# Patient Record
Sex: MALE | Race: WHITE | Employment: UNEMPLOYED | URBAN - METROPOLITAN AREA
[De-identification: names, ages, dates, MRNs, and addresses within clinical notes are randomized per-mention and may not be internally consistent; named-entity substitution may affect disease eponyms.]

---

## 2022-07-07 ENCOUNTER — HOSPITAL ENCOUNTER (EMERGENCY)
Age: 27
Discharge: HOME | End: 2022-07-07
Attending: EMERGENCY MEDICINE
Payer: MEDICARE

## 2022-07-07 VITALS
TEMPERATURE: 98.1 F | HEART RATE: 70 BPM | OXYGEN SATURATION: 96 % | DIASTOLIC BLOOD PRESSURE: 79 MMHG | SYSTOLIC BLOOD PRESSURE: 108 MMHG | RESPIRATION RATE: 18 BRPM

## 2022-07-07 DIAGNOSIS — L73.9 FOLLICULITIS: Primary | ICD-10-CM

## 2022-07-07 PROCEDURE — 99283 EMERGENCY DEPT VISIT LOW MDM: CPT

## 2022-07-07 RX ORDER — CLINDAMYCIN PHOSPHATE 10 MG/G
GEL TOPICAL
Qty: 30 G | Refills: 0 | Status: SHIPPED | OUTPATIENT
Start: 2022-07-07 | End: 2022-07-14

## 2022-07-07 ASSESSMENT — PAIN SCALES - GENERAL: PAINLEVEL_OUTOF10: 0

## 2025-06-12 ENCOUNTER — HOSPITAL ENCOUNTER (EMERGENCY)
Age: 30
Discharge: PSYCHIATRIC OR SUBSTANCE USE FACILITY | End: 2025-06-12
Attending: EMERGENCY MEDICINE
Payer: MEDICAID

## 2025-06-12 VITALS
HEIGHT: 67 IN | SYSTOLIC BLOOD PRESSURE: 113 MMHG | DIASTOLIC BLOOD PRESSURE: 68 MMHG | HEART RATE: 67 BPM | TEMPERATURE: 98.2 F | WEIGHT: 122 LBS | RESPIRATION RATE: 16 BRPM | OXYGEN SATURATION: 97 % | BODY MASS INDEX: 19.15 KG/M2

## 2025-06-12 DIAGNOSIS — R45.851 DEPRESSION WITH SUICIDAL IDEATION: Primary | ICD-10-CM

## 2025-06-12 DIAGNOSIS — F32.A DEPRESSION WITH SUICIDAL IDEATION: Primary | ICD-10-CM

## 2025-06-12 LAB
ALBUMIN SERPL-MCNC: 4.9 G/DL (ref 3.4–5)
ALBUMIN/GLOB SERPL: 2.2 {RATIO}
ALP SERPL-CCNC: 81 U/L (ref 40–129)
ALT SERPL-CCNC: 11 U/L (ref 10–40)
AMPHET UR QL SCN: NEGATIVE
ANION GAP SERPL CALCULATED.3IONS-SCNC: 10 MMOL/L (ref 3–16)
APAP SERPL-MCNC: <5 UG/ML (ref 10–30)
AST SERPL-CCNC: 16 U/L (ref 15–37)
BARBITURATES UR QL SCN: NEGATIVE
BASOPHILS # BLD: 0.03 K/UL (ref 0–0.2)
BASOPHILS NFR BLD: 1 %
BENZODIAZ UR QL: NEGATIVE
BILIRUB SERPL-MCNC: 0.4 MG/DL (ref 0–1)
BILIRUB UR QL STRIP: NEGATIVE
BUN SERPL-MCNC: 12 MG/DL (ref 7–20)
CALCIUM SERPL-MCNC: 9.5 MG/DL (ref 8.3–10.6)
CANNABINOIDS UR QL SCN: NEGATIVE
CHARACTER UR: ABNORMAL
CHLORIDE SERPL-SCNC: 103 MMOL/L (ref 99–110)
CLARITY UR: CLEAR
CO2 SERPL-SCNC: 28 MMOL/L (ref 21–32)
COCAINE UR QL SCN: NEGATIVE
COLOR UR: YELLOW
CREAT SERPL-MCNC: 0.9 MG/DL (ref 0.7–1.8)
EOSINOPHIL # BLD: 0.18 K/UL (ref 0–0.6)
EOSINOPHILS RELATIVE PERCENT: 3 %
EPI CELLS #/AREA URNS HPF: ABNORMAL /HPF
ERYTHROCYTE [DISTWIDTH] IN BLOOD BY AUTOMATED COUNT: 11.7 % (ref 12.4–15.4)
ETHANOLAMINE SERPL-MCNC: NORMAL MG/DL (ref 0–0.08)
FENTANYL UR QL: NEGATIVE
GFR, ESTIMATED: >90 ML/MIN/1.73M2
GLUCOSE SERPL-MCNC: 86 MG/DL (ref 70–99)
GLUCOSE UR STRIP-MCNC: NEGATIVE MG/DL
HCT VFR BLD AUTO: 44.7 % (ref 40.5–52.5)
HGB BLD-MCNC: 14.9 G/DL (ref 13.5–17.5)
HGB UR QL STRIP.AUTO: NEGATIVE
IMM GRANULOCYTES # BLD AUTO: 0.01 K/UL (ref 0–0.5)
IMM GRANULOCYTES NFR BLD: 0 %
KETONES UR STRIP-MCNC: NEGATIVE MG/DL
LEUKOCYTE ESTERASE UR QL STRIP: NEGATIVE
LITHIUM LEVEL: <0.1 MMOL/L (ref 0.6–1.2)
LYMPHOCYTES NFR BLD: 2.45 K/UL (ref 1–5.1)
LYMPHOCYTES RELATIVE PERCENT: 38 %
MCH RBC QN AUTO: 29.4 PG (ref 26–34)
MCHC RBC AUTO-ENTMCNC: 33.3 G/DL (ref 31–36)
MCV RBC AUTO: 88.2 FL (ref 80–100)
METHADONE UR QL: NEGATIVE
MONOCYTES NFR BLD: 0.49 K/UL (ref 0–1.3)
MONOCYTES NFR BLD: 8 %
MUCOUS THREADS URNS QL MICRO: PRESENT
NEUTROPHILS NFR BLD: 51 %
NEUTS SEG NFR BLD: 3.26 K/UL (ref 1.7–7.7)
NITRITE UR QL STRIP: NEGATIVE
OPIATES UR QL SCN: NEGATIVE
OXYCODONE UR QL SCN: NEGATIVE
PCP UR QL SCN: NEGATIVE
PH UR STRIP: 6 [PH]
PH UR STRIP: 6 [PH] (ref 5–8)
PLATELET # BLD AUTO: 226 K/UL (ref 135–450)
PMV BLD AUTO: 9.7 FL (ref 9.4–12.4)
POTASSIUM SERPL-SCNC: 4.1 MMOL/L (ref 3.5–5.1)
PROT SERPL-MCNC: 7.1 G/DL (ref 6.4–8.2)
PROT UR STRIP-MCNC: ABNORMAL MG/DL
RBC # BLD AUTO: 5.07 M/UL (ref 4.2–5.9)
RBC #/AREA URNS HPF: ABNORMAL /HPF
SALICYLATES SERPL-MCNC: <0.5 MG/DL (ref 15–30)
SODIUM SERPL-SCNC: 141 MMOL/L (ref 136–145)
SP GR UR STRIP: 1.02 (ref 1–1.03)
TEST INFORMATION: NORMAL
UROBILINOGEN UR STRIP-ACNC: 1 EU/DL (ref 0–1)
WBC #/AREA URNS HPF: ABNORMAL /HPF
WBC OTHER # BLD: 6.4 K/UL (ref 4–11)

## 2025-06-12 PROCEDURE — 80178 ASSAY OF LITHIUM: CPT

## 2025-06-12 PROCEDURE — 80143 DRUG ASSAY ACETAMINOPHEN: CPT

## 2025-06-12 PROCEDURE — 85025 COMPLETE CBC W/AUTO DIFF WBC: CPT

## 2025-06-12 PROCEDURE — G0480 DRUG TEST DEF 1-7 CLASSES: HCPCS

## 2025-06-12 PROCEDURE — 99285 EMERGENCY DEPT VISIT HI MDM: CPT

## 2025-06-12 PROCEDURE — 80053 COMPREHEN METABOLIC PANEL: CPT

## 2025-06-12 PROCEDURE — 81001 URINALYSIS AUTO W/SCOPE: CPT

## 2025-06-12 PROCEDURE — 80179 DRUG ASSAY SALICYLATE: CPT

## 2025-06-12 PROCEDURE — 80307 DRUG TEST PRSMV CHEM ANLYZR: CPT

## 2025-06-12 RX ORDER — TRAZODONE HYDROCHLORIDE 50 MG/1
50 TABLET ORAL NIGHTLY
COMMUNITY

## 2025-06-12 RX ORDER — HALOPERIDOL 5 MG/1
5 TABLET ORAL 2 TIMES DAILY
COMMUNITY
Start: 2024-10-14

## 2025-06-12 RX ORDER — HYDROXYZINE HYDROCHLORIDE 50 MG/1
50 TABLET, FILM COATED ORAL 3 TIMES DAILY
COMMUNITY

## 2025-06-12 ASSESSMENT — LIFESTYLE VARIABLES
HOW OFTEN DO YOU HAVE A DRINK CONTAINING ALCOHOL: NEVER
HOW MANY STANDARD DRINKS CONTAINING ALCOHOL DO YOU HAVE ON A TYPICAL DAY: PATIENT DOES NOT DRINK

## 2025-06-12 ASSESSMENT — PAIN - FUNCTIONAL ASSESSMENT: PAIN_FUNCTIONAL_ASSESSMENT: 0-10

## 2025-06-12 ASSESSMENT — PAIN SCALES - GENERAL: PAINLEVEL_OUTOF10: 0

## 2025-06-12 NOTE — VIRTUAL HEALTH
Zaki Gonzalez  [id number]  1995     Social Work Behavioral Health Crisis Assessment    06/12/25    Chief Complaint: Suicidal Ideation     HPI: Patient is a 29 y.o. White (non-) male who presents on 6/12/25 via PD for suicidal ideation. Records show pt was found walking on the side of the road by PD and when talking with them pt endorsed some suicidal thoughts. Records show hx of admissions for depression and psychosis.    Upon assessment pt is calm and agreeable to meet with writer via [employer]. He is A&Ox4 but does seem easily distracted and does appear to have some thought blocking. When asked about reason for presentation pt states \"I was walking on the side of the road and someone called because they were worried about me and I told the  I didn't want to be here anymore so they brought me to the hospital\". Pt endorses SI and says he has thought about various methods but denies a plan currently. He did tell the ED provider \"I want to go into the wilderness, and wither away, and do it slowly\". He reports hx of attempts. Pt states \"I was feeling kind of crazy, like not in my right mind all the way\" and says he's been feeling this way \"for a long time\". Pt endorses paranoia about something bad happening and says \"I'm just worried about the world and people\". He is very vague but does say he's worried about people hurting them. He denies HI and says \"I haven't had those thoughts for a while\". He denies AH/VH currently but does report a hx and records show previous hospitalizations for psychosis. Pt does appear to have some significant thought blocking. Pt denies being on prescribed medications but says his family gives him Suboxone that he's been taking \"for years\". However he denies a hx of substance abuse so it is unclear why he has been taking it. He says if he doesn't take it he has trouble sleeping or eating. He denies any current substance use. He states he lives with his grandfather but

## 2025-06-12 NOTE — ED PROVIDER NOTES
evaluate the patient.  Care was discussed with Yennifer, psychiatry provider who evaluated the patient.  She recommends inpatient hospitalization    A consult was placed to psychiatry to facilitate admission to an inpatient psychiatric facility.    [facility] and cannot accept the patient.    2:43 PM: The patient was accepted by Dr. Zhang Victor at [facility].  He will be transferred by BLS ambulance.  Patient's clinical condition is unchanged.  He is afebrile and hemodynamically stable.  He is stable for transfer/transport    I am the primary attending of record.    CRITICAL CARE TIME   Total Critical Care time was 0 minutes, excluding separately reportable procedures.  There was a high probability of clinically significant/life threatening deterioration in the patient's condition which required my urgent intervention.      CONSULTS:  IP CONSULT TO TELE-PSYCH (SOCIAL WORK ONLY)    PROCEDURES:  Unless otherwise noted below, none     Procedures        FINAL IMPRESSION      1. Depression with suicidal ideation          DISPOSITION/PLAN   DISPOSITION Decision To Transfer 06/12/2025 08:39:09 AM      PATIENT REFERRED TO:  No follow-up provider specified.    DISCHARGE MEDICATIONS:  New Prescriptions    No medications on file     Controlled Substances Monitoring:          No data to display                (Please note that portions of this note were completed with a voice recognition program.  Efforts were made to edit the dictations but occasionally words are mis-transcribed.)    MELISSA CUNNINGHAM DO (electronically signed)  Attending Emergency Physician            Melissa Cunningham DO  06/12/25 [id number]

## 2025-06-12 NOTE — VIRTUAL HEALTH
Received request for Telepsychiatry evaluation.    Chart reviewed, Case discussed with staff, and Medical screening labs are still pending.  Our team will continue to follow and will initiate evaluation when more information is available Spoke with JORDY Payne.         Total time spent on this encounter: Not billed by time    --Jyoti Rosas LCSW on 6/12/2025 at 7:08 AM    An electronic signature was used to authenticate this note.

## 2025-06-12 NOTE — ED TRIAGE NOTES
Pt states he lives with his grandfather, got into an argument with him, slept there last night but left this morning, walking along the bike trail \"to think\" and the police saw him and asked if he was ok, he stated he did not want to harm himself but did not want to go on with these problems and was brought to the hospital.

## 2025-06-12 NOTE — ED NOTES
Pt does not want to order lunch at this time.   
Pt speaking to telepsych at this time.   
Report given to Fabian Ayon EMT-B with CMT. All paperwork, clothing, and medication was given to Fabian JACKSON. Pt transported to [facility] unit.   
Waffles, scrambled eggs with american cheese, phan, orange juice, decaf coffee, cream, sugar, maple syrup, salt, pepper.   
A 05/05/2015    Hepatitis B 1995, 1995, 05/04/1998    Hib, unspecified 1997, 1997, 12/09/1998, 12/10/1998    MMR, PRIORIX, M-M-R II, (age 12m+), SC, 0.5mL 12/09/1998, 04/04/2003    Meningococcal ACWY, MENACTRA (MenACWY-D), (age 9m-55y), IM, 0.5mL 05/05/2015    Poliovirus, IPOL, (age 6w+), SC/IM, 0.5mL 1997, 1997, 12/09/1998, 04/04/2003    TDaP, ADACEL (age 10y-64y), BOOSTRIX (age 10y+), IM, 0.5mL 05/05/2015    Varicella, VARIVAX, (age 12m+), SC, 0.5mL 08/04/1998, 05/05/2015